# Patient Record
Sex: MALE | Race: BLACK OR AFRICAN AMERICAN | NOT HISPANIC OR LATINO | URBAN - METROPOLITAN AREA
[De-identification: names, ages, dates, MRNs, and addresses within clinical notes are randomized per-mention and may not be internally consistent; named-entity substitution may affect disease eponyms.]

---

## 2019-06-05 ENCOUNTER — OUTPATIENT (OUTPATIENT)
Dept: OUTPATIENT SERVICES | Facility: HOSPITAL | Age: 64
LOS: 1 days | Discharge: HOME | End: 2019-06-05

## 2019-06-11 LAB — SURGICAL PATHOLOGY STUDY: SIGNIFICANT CHANGE UP

## 2024-09-30 ENCOUNTER — OUTPATIENT (OUTPATIENT)
Dept: OUTPATIENT SERVICES | Facility: HOSPITAL | Age: 69
LOS: 1 days | End: 2024-09-30
Payer: MEDICARE

## 2024-09-30 DIAGNOSIS — R97.20 ELEVATED PROSTATE SPECIFIC ANTIGEN [PSA]: ICD-10-CM

## 2024-10-02 LAB — SURGICAL PATHOLOGY STUDY: SIGNIFICANT CHANGE UP

## 2024-10-02 PROCEDURE — 88321 CONSLTJ&REPRT SLD PREP ELSWR: CPT

## 2024-10-29 VITALS
SYSTOLIC BLOOD PRESSURE: 134 MMHG | HEART RATE: 75 BPM | HEIGHT: 75 IN | TEMPERATURE: 97 F | DIASTOLIC BLOOD PRESSURE: 82 MMHG | RESPIRATION RATE: 16 BRPM | OXYGEN SATURATION: 97 % | WEIGHT: 242.51 LBS

## 2024-10-29 RX ORDER — LORATADINE 10 MG/1
1 TABLET ORAL
Refills: 0 | DISCHARGE

## 2024-10-29 RX ORDER — ASPIRIN/MAG CARB/ALUMINUM AMIN 325 MG
0 TABLET ORAL
Refills: 0 | DISCHARGE

## 2024-10-29 RX ORDER — CALCIPOTRIENE 50 UG/G
1 CREAM TOPICAL
Refills: 0 | DISCHARGE

## 2024-10-29 NOTE — PATIENT PROFILE ADULT - HISTORY OF COVID-19 VACCINATION
The following changes were made to your medications today:   Continue all your current medications. The following lifestyle modifications are encouraged:  Continue regular exercise at least 30 minutes daily. Lowfat/Low Cholesterol diet advised. Low sodium diet <2000mgs/day. The following tests have been ordered:  None today       Follow up with primary cardiologist, Apurva Gonzalez in 6 months. .    Additional Educational Resources: For additional resources regarding your symptoms, diagnosis, or further health information, please visit the Health Resources section on [email or web address] or the Online Health Resources section in [employer] Yes

## 2024-10-29 NOTE — PRE-ANESTHESIA EVALUATION ADULT - NSANTHPMHFT_GEN_ALL_CORE
69-year-old male presenting for robotic prostatectomy and bilateral pelvic lymph node dissection. History of HLD HTN CKD ANIRUDH GERD Asthma.

## 2024-10-29 NOTE — PATIENT PROFILE ADULT - MONEY FOR FOOD
no Post-Care Instructions: MOHS POST-OPERATIVE WOUND CARE\\n\\n1. Leave bandage on for the first 2 days.  Do not get the bandage wet (i.e., submerged in a bath, pool, or sweating).  Please limit activities while your stitches are in.  Stitches can break.  Skin can tear.  If you feel tension on the stitches you must stop that motion/activity.\\n\\n2. A shower may be taken after the first 2 days. Please take sponge baths during the first two days after your surgery, unless instructed otherwise.\\n\\n3. Carefully remove the bandage so as not to irritate the wound. \\n\\n4. Gently cleanse the area with soap and water.\\n\\n5. Apply a thin layer of Vaseline or Bacitracin ointment to the wound.  If another antibiotic has been prescribed, please apply as instructed on the prescription packaging. \\n\\n8. Cut a piece of non-stick gauze or Telfa pad to a size just large enough to cover the surgical site.\\n\\n9. Apply medical tape or hypoallergenic paper tape (for those with allergy to bandage adhesive) to hold your bandage in place.\\n\\n10.  The surgical site should be cleansed and dressed as described above on a daily basis until the day of   \\n   suture removal.  \\n\\n11.  If shaving around the area, leave your bandage on and shave around it.  Shaving over the sutures could \\n allow the wound to open up or increase your infection risk.\\n\\nDO NOT SWIM OR GO IN A HOT TUB UNTIL SUTURES HAVE BEEN REMOVED.\\n \\nADDITIONAL INSTRUCTIONS:\\n\\n1. Bruising and swelling may occur after the surgery and can increase for up to 48 hours.  Do not be alarmed--this will resolve over time.  Applying ice compresses over the bandage will help to decrease swelling and bruising:   ICE: Wrap a clean hand-towel or handkerchief around your ice pack.\\n Apply over the bandage for 15 minutes on and off for 2 hours to help reduce swelling.\\n\\n2. If slight bleeding occurs, apply continuous pressure for 20 minutes.  If bleeding persists or is severe, call the office.  If the office is closed, call 911 or go to the nearest emergency room.\\n\\n3. If the wound becomes hot or extremely tender to touch or it is bright red around the wound, extending 1 inch beyond the wound edge, please contact us at [phone].  If after hours, call [phone].\\n\\n4. Some oozing from the wound is normal.\\n\\n5. If healing without sutures:  Signs of healing may not be apparent for 2-3 weeks and the wound may take 6-8 weeks or longer to completely heal.  Clean the area with soap and water daily. Apply Vaseline daily or prescription ointment as directed.

## 2024-10-30 ENCOUNTER — INPATIENT (INPATIENT)
Facility: HOSPITAL | Age: 69
LOS: 0 days | Discharge: HOME | DRG: 708 | End: 2024-10-31
Attending: UROLOGY | Admitting: UROLOGY
Payer: MEDICARE

## 2024-10-30 DIAGNOSIS — C61 MALIGNANT NEOPLASM OF PROSTATE: ICD-10-CM

## 2024-10-30 DIAGNOSIS — Z98.890 OTHER SPECIFIED POSTPROCEDURAL STATES: Chronic | ICD-10-CM

## 2024-10-30 DIAGNOSIS — Z41.9 ENCOUNTER FOR PROCEDURE FOR PURPOSES OTHER THAN REMEDYING HEALTH STATE, UNSPECIFIED: Chronic | ICD-10-CM

## 2024-10-30 LAB
BLD GP AB SCN SERPL QL: NEGATIVE — SIGNIFICANT CHANGE UP
RH IG SCN BLD-IMP: POSITIVE — SIGNIFICANT CHANGE UP

## 2024-10-30 PROCEDURE — 88309 TISSUE EXAM BY PATHOLOGIST: CPT | Mod: 26

## 2024-10-30 PROCEDURE — 88305 TISSUE EXAM BY PATHOLOGIST: CPT | Mod: 26

## 2024-10-30 DEVICE — LIGATING CLIPS WECK HEMOLOK POLYMER LARGE (PURPLE) 6: Type: IMPLANTABLE DEVICE | Site: BILATERAL | Status: FUNCTIONAL

## 2024-10-30 RX ORDER — HEPARIN SODIUM 10000 [USP'U]/ML
5000 INJECTION INTRAVENOUS; SUBCUTANEOUS EVERY 8 HOURS
Refills: 0 | Status: DISCONTINUED | OUTPATIENT
Start: 2024-10-30 | End: 2024-10-31

## 2024-10-30 RX ORDER — FAMOTIDINE 10 MG/ML
1 INJECTION INTRAVENOUS
Refills: 0 | DISCHARGE

## 2024-10-30 RX ORDER — ACETAMINOPHEN 500 MG
1000 TABLET ORAL ONCE
Refills: 0 | Status: COMPLETED | OUTPATIENT
Start: 2024-10-30 | End: 2024-10-31

## 2024-10-30 RX ORDER — LIDOCAINE HYDROCHLORIDE 40 MG/ML
1 SOLUTION TOPICAL EVERY 24 HOURS
Refills: 0 | Status: DISCONTINUED | OUTPATIENT
Start: 2024-10-30 | End: 2024-10-31

## 2024-10-30 RX ORDER — ACETAMINOPHEN 500 MG
1000 TABLET ORAL ONCE
Refills: 0 | Status: COMPLETED | OUTPATIENT
Start: 2024-10-30 | End: 2024-10-30

## 2024-10-30 RX ORDER — ALBUTEROL 90 MCG
2 AEROSOL (GRAM) INHALATION
Refills: 0 | DISCHARGE

## 2024-10-30 RX ORDER — OXYCODONE HYDROCHLORIDE 30 MG/1
5 TABLET ORAL EVERY 6 HOURS
Refills: 0 | Status: DISCONTINUED | OUTPATIENT
Start: 2024-10-30 | End: 2024-10-31

## 2024-10-30 RX ORDER — ONDANSETRON HYDROCHLORIDE 2 MG/ML
4 INJECTION, SOLUTION INTRAMUSCULAR; INTRAVENOUS EVERY 6 HOURS
Refills: 0 | Status: DISCONTINUED | OUTPATIENT
Start: 2024-10-30 | End: 2024-10-31

## 2024-10-30 RX ORDER — FLUTICASONE PROPIONATE 50 UG/1
2 SPRAY, METERED NASAL
Refills: 0 | DISCHARGE

## 2024-10-30 RX ORDER — HYDROMORPHONE HCL/0.9% NACL/PF 6 MG/30 ML
0.5 PATIENT CONTROLLED ANALGESIA SYRINGE INTRAVENOUS EVERY 4 HOURS
Refills: 0 | Status: DISCONTINUED | OUTPATIENT
Start: 2024-10-30 | End: 2024-10-31

## 2024-10-30 RX ORDER — AMLODIPINE BESYLATE 10 MG
10 TABLET ORAL DAILY
Refills: 0 | Status: DISCONTINUED | OUTPATIENT
Start: 2024-10-31 | End: 2024-10-31

## 2024-10-30 RX ORDER — SODIUM CHLORIDE 9 MG/ML
1000 INJECTION, SOLUTION INTRAMUSCULAR; INTRAVENOUS; SUBCUTANEOUS
Refills: 0 | Status: DISCONTINUED | OUTPATIENT
Start: 2024-10-30 | End: 2024-10-31

## 2024-10-30 RX ORDER — BRIMONIDINE TARTRATE 0.2 %
1 DROPS OPHTHALMIC (EYE)
Refills: 0 | DISCHARGE

## 2024-10-30 RX ORDER — ALBUTEROL 90 MCG
1 AEROSOL (GRAM) INHALATION EVERY 6 HOURS
Refills: 0 | Status: DISCONTINUED | OUTPATIENT
Start: 2024-10-30 | End: 2024-10-31

## 2024-10-30 RX ORDER — OXYBUTYNIN CHLORIDE 5 MG/1
5 TABLET ORAL EVERY 8 HOURS
Refills: 0 | Status: DISCONTINUED | OUTPATIENT
Start: 2024-10-30 | End: 2024-10-31

## 2024-10-30 RX ORDER — LOSARTAN POTASSIUM 25 MG/1
1 TABLET ORAL
Refills: 0 | DISCHARGE

## 2024-10-30 RX ORDER — AMLODIPINE AND ATORVASTATIN 5; 20 MG/1; MG/1
1 TABLET, COATED ORAL
Refills: 0 | DISCHARGE

## 2024-10-30 RX ORDER — LATANOPROST 0.005 %
1 DROPS OPHTHALMIC (EYE)
Refills: 0 | DISCHARGE

## 2024-10-30 RX ORDER — ACETAMINOPHEN 500 MG
975 TABLET ORAL EVERY 6 HOURS
Refills: 0 | Status: DISCONTINUED | OUTPATIENT
Start: 2024-10-30 | End: 2024-10-31

## 2024-10-30 RX ORDER — HEPARIN SODIUM 10000 [USP'U]/ML
5000 INJECTION INTRAVENOUS; SUBCUTANEOUS ONCE
Refills: 0 | Status: COMPLETED | OUTPATIENT
Start: 2024-10-30 | End: 2024-10-30

## 2024-10-30 RX ORDER — LOSARTAN POTASSIUM 25 MG/1
25 TABLET ORAL DAILY
Refills: 0 | Status: DISCONTINUED | OUTPATIENT
Start: 2024-10-31 | End: 2024-10-31

## 2024-10-30 RX ADMIN — Medication 1000 MILLIGRAM(S): at 14:13

## 2024-10-30 RX ADMIN — HEPARIN SODIUM 5000 UNIT(S): 10000 INJECTION INTRAVENOUS; SUBCUTANEOUS at 14:13

## 2024-10-30 NOTE — CONSULT NOTE ADULT - ASSESSMENT
HPI: 70 YO M with pmh asthma, htn, gerd, CKD, HLD, glaucoma, prostate ca presents for robotic radical prostatectomy with Dr Dean. Patient denies CP / SOB / DYspnea / palpitations / fevers / chills / N/ V  albuterol PRN for SOB / Wheezing Q 4 hours  continue norvasc with holding parameters / losartan if creatinine stable   famotidine daily for GERD, opthalmic solution for glaucoma    Home Medications:  acetaminophen 325 mg oral tablet: 3 tab(s) orally every 6 hours (31 Oct 2024 11:08)  Albuterol (Eqv-Proventil HFA) 90 mcg/inh inhalation aerosol: 2 inhaled every 6 hours (30 Oct 2024 14:07)  amLODIPine 10 mg oral tablet: 1 tab(s) orally once a day (31 Oct 2024 11:08)  amlodipine-atorvastatin 10 mg-20 mg oral tablet: 1 tab(s) orally once a day (30 Oct 2024 14:07)  brimonidine 0.1% ophthalmic solution: 1 drop(s) in each affected eye once a day (30 Oct 2024 14:07)  famotidine 40 mg oral tablet: 1 tab(s) orally once a day (30 Oct 2024 14:07)  fluticasone 50 mcg/inh nasal spray: 2 spray(s) in each nostril once a day (30 Oct 2024 14:07)  latanoprost 0.005% ophthalmic emulsion: 1 drop(s) in each affected eye once a day (30 Oct 2024 14:07)  losartan 25 mg oral tablet: 1 tab(s) orally once a day (30 Oct 2024 14:07)

## 2024-10-30 NOTE — CONSULT NOTE ADULT - SUBJECTIVE AND OBJECTIVE BOX
HPI: HPI:        PAST MEDICAL & SURGICAL HISTORY:  Asthma  as a child      Allergic rhinitis      HTN (hypertension)      GERD (gastroesophageal reflux disease)      CKD (chronic kidney disease)      HLD (hyperlipidemia)      Glaucoma      H/O colonoscopy      H/O endoscopy      Elective surgery  AVM stomach      H/O arthroscopy of knee  right      H/O wrist surgery  right      H/O sinus surgery          Allergies    penicillin (Hives; Rash)    Intolerances        MEDICATIONS  (STANDING):    MEDICATIONS  (PRN):      SOCIAL HISTORY:    FAMILY HISTORY:        Vital Signs Last 24 Hrs  T(C): --  T(F): --  HR: --  BP: --  BP(mean): --  RR: --  SpO2: --        I&O's Summary      LABS:                CAPILLARY BLOOD GLUCOSE          Cultures:      PHYSICAL EXAM:  General: NAD, resting comfortably  HEENT: NC/AT, EOMI, normal hearing, no oral lesions, no LAD, neck supple  Pulmonary: Normal resp effort, CTA-B  Cardiovascular: NSR, no murmurs  Abdominal: Soft, ND/NT, no organomegaly  Groin: Soft, nontender, no ecchymosis/hematoma, no erythema, no edema.  Extremities: (+) DP/PT pulses. FROM, normal strength, no clubbing/cyanosis/erythema/edema  Neuro: A/O x 3, CNs II-XII grossly intact, normal sensation, no focal deficits  Pulses: Palpable distal pulses    RADIOLOGY & ADDITIONAL STUDIES:      ASSESSMENT:      PLAN:         HPI: 68 YO M with pmh asthma, htn, gerd, CKD, HLD, glaucoma, prostate ca presents for robotic radical prostatectomy with Dr Dean. Patient denies CP / SOB / DYspnea / palpitations / fevers / chills / N/ V    ROS: All 12 systems reviewed and negative except for HPI     PAST MEDICAL & SURGICAL HISTORY:  Asthma  as a child      Allergic rhinitis      HTN (hypertension)      GERD (gastroesophageal reflux disease)      CKD (chronic kidney disease)      HLD (hyperlipidemia)      Glaucoma      H/O colonoscopy      H/O endoscopy      Elective surgery  AVM stomach      H/O arthroscopy of knee  right      H/O wrist surgery  right      H/O sinus surgery          Allergies    penicillin (Hives; Rash)    Intolerances        MEDICATIONS  (STANDING):    MEDICATIONS  (PRN):      SOCIAL HISTORY:    FAMILY HISTORY:        Vital Signs Last 24 Hrs  T(C): --  T(F): --  HR: --  BP: --  BP(mean): --  RR: --  SpO2: --        I&O's Summary      LABS:                CAPILLARY BLOOD GLUCOSE          Cultures:      PHYSICAL EXAM:  General: NAD, resting comfortably  HEENT: NC/AT, EOMI, normal hearing, no oral lesions, no LAD, neck supple  Pulmonary: Normal resp effort, CTA-B  Cardiovascular: NSR, no murmurs  Abdominal: Soft, ND/NT, no organomegaly  Extremities: (+) DP/PT pulses. FROM, normal strength, no clubbing/cyanosis/erythema/edema  Neuro: A/O x 3, CNs II-XII grossly intact, normal sensation, no focal deficits  Pulses: Palpable distal pulses    RADIOLOGY & ADDITIONAL STUDIES:      ASSESSMENT:      PLAN:

## 2024-10-31 VITALS
SYSTOLIC BLOOD PRESSURE: 129 MMHG | OXYGEN SATURATION: 95 % | HEART RATE: 92 BPM | RESPIRATION RATE: 17 BRPM | DIASTOLIC BLOOD PRESSURE: 77 MMHG | TEMPERATURE: 99 F

## 2024-10-31 LAB
ANION GAP SERPL CALC-SCNC: 12 MMOL/L — SIGNIFICANT CHANGE UP (ref 5–17)
BUN SERPL-MCNC: 19 MG/DL — SIGNIFICANT CHANGE UP (ref 7–23)
CALCIUM SERPL-MCNC: 8.6 MG/DL — SIGNIFICANT CHANGE UP (ref 8.4–10.5)
CHLORIDE SERPL-SCNC: 102 MMOL/L — SIGNIFICANT CHANGE UP (ref 96–108)
CO2 SERPL-SCNC: 23 MMOL/L — SIGNIFICANT CHANGE UP (ref 22–31)
CREAT SERPL-MCNC: 1.33 MG/DL — HIGH (ref 0.5–1.3)
EGFR: 58 ML/MIN/1.73M2 — LOW
GLUCOSE SERPL-MCNC: 155 MG/DL — HIGH (ref 70–99)
HCT VFR BLD CALC: 43 % — SIGNIFICANT CHANGE UP (ref 39–50)
HGB BLD-MCNC: 14.1 G/DL — SIGNIFICANT CHANGE UP (ref 13–17)
MCHC RBC-ENTMCNC: 27.2 PG — SIGNIFICANT CHANGE UP (ref 27–34)
MCHC RBC-ENTMCNC: 32.8 G/DL — SIGNIFICANT CHANGE UP (ref 32–36)
MCV RBC AUTO: 83 FL — SIGNIFICANT CHANGE UP (ref 80–100)
NRBC # BLD: 0 /100 WBCS — SIGNIFICANT CHANGE UP (ref 0–0)
PLATELET # BLD AUTO: 236 K/UL — SIGNIFICANT CHANGE UP (ref 150–400)
POTASSIUM SERPL-MCNC: 3.9 MMOL/L — SIGNIFICANT CHANGE UP (ref 3.5–5.3)
POTASSIUM SERPL-SCNC: 3.9 MMOL/L — SIGNIFICANT CHANGE UP (ref 3.5–5.3)
RBC # BLD: 5.18 M/UL — SIGNIFICANT CHANGE UP (ref 4.2–5.8)
RBC # FLD: 14.4 % — SIGNIFICANT CHANGE UP (ref 10.3–14.5)
SODIUM SERPL-SCNC: 137 MMOL/L — SIGNIFICANT CHANGE UP (ref 135–145)
WBC # BLD: 9.55 K/UL — SIGNIFICANT CHANGE UP (ref 3.8–10.5)
WBC # FLD AUTO: 9.55 K/UL — SIGNIFICANT CHANGE UP (ref 3.8–10.5)

## 2024-10-31 RX ORDER — LEVOFLOXACIN 750 MG/1
1 TABLET, FILM COATED ORAL
Qty: 3 | Refills: 0
Start: 2024-10-31 | End: 2024-11-02

## 2024-10-31 RX ORDER — CEFAZOLIN SODIUM 1 G
1000 VIAL (EA) INJECTION EVERY 8 HOURS
Refills: 0 | Status: COMPLETED | OUTPATIENT
Start: 2024-10-31 | End: 2024-10-31

## 2024-10-31 RX ORDER — ACETAMINOPHEN 500 MG
3 TABLET ORAL
Qty: 0 | Refills: 0 | DISCHARGE
Start: 2024-10-31

## 2024-10-31 RX ORDER — SIMETHICONE 80 MG/1
160 TABLET, CHEWABLE ORAL ONCE
Refills: 0 | Status: COMPLETED | OUTPATIENT
Start: 2024-10-31 | End: 2024-10-31

## 2024-10-31 RX ORDER — AMLODIPINE BESYLATE 10 MG
1 TABLET ORAL
Qty: 0 | Refills: 0 | DISCHARGE
Start: 2024-10-31

## 2024-10-31 RX ADMIN — Medication 975 MILLIGRAM(S): at 05:03

## 2024-10-31 RX ADMIN — Medication 10 MILLIGRAM(S): at 05:44

## 2024-10-31 RX ADMIN — Medication 0.5 MILLIGRAM(S): at 00:48

## 2024-10-31 RX ADMIN — Medication 100 MILLIGRAM(S): at 05:44

## 2024-10-31 RX ADMIN — LOSARTAN POTASSIUM 25 MILLIGRAM(S): 25 TABLET ORAL at 05:44

## 2024-10-31 RX ADMIN — LIDOCAINE HYDROCHLORIDE 1 PATCH: 40 SOLUTION TOPICAL at 11:40

## 2024-10-31 RX ADMIN — Medication 975 MILLIGRAM(S): at 11:34

## 2024-10-31 RX ADMIN — LIDOCAINE HYDROCHLORIDE 1 PATCH: 40 SOLUTION TOPICAL at 00:32

## 2024-10-31 RX ADMIN — LIDOCAINE HYDROCHLORIDE 1 PATCH: 40 SOLUTION TOPICAL at 07:00

## 2024-10-31 RX ADMIN — HEPARIN SODIUM 5000 UNIT(S): 10000 INJECTION INTRAVENOUS; SUBCUTANEOUS at 05:44

## 2024-10-31 RX ADMIN — Medication 1000 MILLIGRAM(S): at 00:48

## 2024-10-31 RX ADMIN — Medication 0.5 MILLIGRAM(S): at 00:31

## 2024-10-31 RX ADMIN — Medication 400 MILLIGRAM(S): at 00:09

## 2024-10-31 RX ADMIN — SIMETHICONE 160 MILLIGRAM(S): 80 TABLET, CHEWABLE ORAL at 05:44

## 2024-10-31 NOTE — DISCHARGE NOTE NURSING/CASE MANAGEMENT/SOCIAL WORK - PATIENT PORTAL LINK FT
You can access the FollowMyHealth Patient Portal offered by [facility] by registering at the following website: [email or web address]. By joining [facility]’s FollowMyHealth portal, you will also be able to view your health information using other applications (apps) compatible with our system.

## 2024-10-31 NOTE — PROGRESS NOTE ADULT - ASSESSMENT
HPI: 68 YO M with pmh asthma, htn, gerd, CKD, HLD, glaucoma, prostate ca presents for robotic radical prostatectomy with Dr Dean. Patient denies CP / SOB / DYspnea / palpitations / fevers / chills / N/ V  albuterol PRN for SOB / Wheezing Q 4 hours  continue norvasc with holding parameters / losartan if creatinine stable   famotidine daily for GERD, opthalmic solution for glaucoma  if issues with movement of left lower extremity persist -   patient possibly has radiculopathy from positioning in hospital bed, gait observed and normal, walking well, DTRs intact, strength intact b/l   recommend following with PCP for imaging within one week   if symptoms worsen, recommend to go to local ER     
69 year old male with histry of CaP s/p RALP 10/30

## 2024-10-31 NOTE — DISCHARGE NOTE NURSING/CASE MANAGEMENT/SOCIAL WORK - FINANCIAL ASSISTANCE
[facility] provides services at a reduced cost to those who are determined to be eligible through [facility]’s financial assistance program. Information regarding [facility]’s financial assistance program can be found by going to [email or web address] or by calling [phone].

## 2024-10-31 NOTE — PROGRESS NOTE ADULT - SUBJECTIVE AND OBJECTIVE BOX
HPI: 68 YO M with pmh asthma, htn, gerd, CKD, HLD, glaucoma, prostate ca presents for robotic radical prostatectomy with Dr Dean. Patient denies CP / SOB / DYspnea / palpitations / fevers / chills / N/ V    ROS: All 12 systems reviewed and negative except for HPI   pt seen and examined at bedside  pain is controlled post operatively  patient states does have some difficulty with coordination of his left leg movements  though patient walking well, states strength and sensation are wnl   denying previous history of TIA / CVA / radiculopathy/  lumbar spinal disease     Vital Signs Last 24 Hrs  T(C): 36.8 (31 Oct 2024 08:20), Max: 36.8 (31 Oct 2024 01:55)  T(F): 98.2 (31 Oct 2024 08:20), Max: 98.2 (31 Oct 2024 01:55)  HR: 101 (31 Oct 2024 08:20) (81 - 116)  BP: 132/88 (31 Oct 2024 08:20) (125/83 - 149/82)  BP(mean): 105 (31 Oct 2024 01:10) (100 - 108)  RR: 18 (31 Oct 2024 08:20) (10 - 22)  SpO2: 95% (31 Oct 2024 08:20) (93% - 98%)    Parameters below as of 31 Oct 2024 08:20  Patient On (Oxygen Delivery Method): room air    MEDICATIONS  (STANDING):  acetaminophen     Tablet .. 975 milliGRAM(s) Oral every 6 hours  amLODIPine   Tablet 10 milliGRAM(s) Oral daily  atorvastatin 20 milliGRAM(s) Oral at bedtime  ceFAZolin   IVPB 1000 milliGRAM(s) IV Intermittent every 8 hours  heparin   Injectable 5000 Unit(s) SubCutaneous every 8 hours  lidocaine   4% Patch 1 Patch Transdermal every 24 hours  losartan 25 milliGRAM(s) Oral daily  sodium chloride 0.9%. 1000 milliLiter(s) (120 mL/Hr) IV Continuous <Continuous>    MEDICATIONS  (PRN):  albuterol    90 MICROgram(s) HFA Inhaler 1 Puff(s) Inhalation every 6 hours PRN Shortness of Breath and/or Wheezing  HYDROmorphone  Injectable 0.5 milliGRAM(s) IV Push every 4 hours PRN breakthrough pain  ondansetron Injectable 4 milliGRAM(s) IV Push every 6 hours PRN Nausea and/or Vomiting  oxybutynin 5 milliGRAM(s) Oral every 8 hours PRN Bladder spasms  oxyCODONE    IR 5 milliGRAM(s) Oral every 6 hours PRN Severe Pain (7 - 10)        PHYSICAL EXAM:  General: NAD, resting comfortably  HEENT: NC/AT, EOMI, normal hearing, no oral lesions, no LAD, neck supple  Pulmonary: Normal resp effort, CTA-B  Cardiovascular: NSR, no murmurs  Abdominal: Soft, ND/NT, no organomegaly  Extremities: (+) DP/PT pulses. FROM, normal strength, no clubbing/cyanosis/erythema/edema  Neuro: A/O x 3, CNs II-XII grossly intact, normal sensation, strength b/l LE 5/5, 5/5 strength in LLE as well, reflexes 2+ patellas, ankle reflexes   gait observed and normal   Pulses: Palpable distal pulses    RADIOLOGY & ADDITIONAL STUDIES:      ASSESSMENT:      PLAN:        
INTERVAL HPI/OVERNIGHT EVENTS:  No acute events overnight.    VITALS:    T(F): 97.8 (10-31-24 @ 04:15), Max: 98.2 (10-31-24 @ 01:55)  HR: 116 (10-31-24 @ 04:15) (81 - 116)  BP: 131/87 (10-31-24 @ 04:15) (125/83 - 149/82)  RR: 18 (10-31-24 @ 04:15) (10 - 22)  SpO2: 95% (10-31-24 @ 04:15) (93% - 98%)  Wt(kg): --    I&O's Detail    30 Oct 2024 07:01  -  31 Oct 2024 05:22  --------------------------------------------------------  IN:    IV PiggyBack: 100 mL    sodium chloride 0.9%: 480 mL  Total IN: 580 mL    OUT:    Indwelling Catheter - Urethral (mL): 500 mL  Total OUT: 500 mL    Total NET: 80 mL          MEDICATIONS:    ANTIBIOTICS:  ceFAZolin   IVPB 1000 milliGRAM(s) IV Intermittent every 8 hours      PAIN CONTROL:  acetaminophen     Tablet .. 975 milliGRAM(s) Oral every 6 hours  HYDROmorphone  Injectable 0.5 milliGRAM(s) IV Push every 4 hours PRN  ondansetron Injectable 4 milliGRAM(s) IV Push every 6 hours PRN  oxyCODONE    IR 5 milliGRAM(s) Oral every 6 hours PRN       MEDS:  oxybutynin 5 milliGRAM(s) Oral every 8 hours PRN      HEME/ONC  heparin   Injectable 5000 Unit(s) SubCutaneous every 8 hours        PHYSICAL EXAM:  General: No acute distress.  Alert and Oriented  Abdominal Exam: incisions dry and clean, appropriately distended, appropriately tender to palpation   Exam: Potts catheter in place, urine clear

## 2024-10-31 NOTE — PROGRESS NOTE ADULT - PROBLEM SELECTOR PLAN 1
-stable  -BP stable  -pain control  -continue marinelli  -monitor I&Os  -OOB, IS  -DVT prophylaxis  -Abx prophylaxis  -am labs  -Diet: CLD.

## 2024-10-31 NOTE — DISCHARGE NOTE PROVIDER - HOSPITAL COURSE
69 year old male with history of CaP s/p RALP 10/30/24. Patient tolerated procedure well. He is afebrile, ambulatory, tolerating po diet, pain controlled and hemodynamically stable. Patient is planned for discharge home today, per urology.     69 year old male with history of CaP s/p RALP 10/30/24. Patient tolerated procedure well. He is afebrile, ambulatory, tolerating po diet, pain controlled and hemodynamically stable. Patient is medically clear for discharge home today.

## 2024-10-31 NOTE — DISCHARGE NOTE PROVIDER - NSDCMRMEDTOKEN_GEN_ALL_CORE_FT
Albuterol (Eqv-Proventil HFA) 90 mcg/inh inhalation aerosol: 2 inhaled every 6 hours  amlodipine-atorvastatin 10 mg-20 mg oral tablet: 1 tab(s) orally once a day  brimonidine 0.1% ophthalmic solution: 1 drop(s) in each affected eye once a day  famotidine 40 mg oral tablet: 1 tab(s) orally once a day  fluticasone 50 mcg/inh nasal spray: 2 spray(s) in each nostril once a day  latanoprost 0.005% ophthalmic emulsion: 1 drop(s) in each affected eye once a day  losartan 25 mg oral tablet: 1 tab(s) orally once a day   acetaminophen 325 mg oral tablet: 3 tab(s) orally every 6 hours  Albuterol (Eqv-Proventil HFA) 90 mcg/inh inhalation aerosol: 2 inhaled every 6 hours  amLODIPine 10 mg oral tablet: 1 tab(s) orally once a day  amlodipine-atorvastatin 10 mg-20 mg oral tablet: 1 tab(s) orally once a day  brimonidine 0.1% ophthalmic solution: 1 drop(s) in each affected eye once a day  famotidine 40 mg oral tablet: 1 tab(s) orally once a day  fluticasone 50 mcg/inh nasal spray: 2 spray(s) in each nostril once a day  latanoprost 0.005% ophthalmic emulsion: 1 drop(s) in each affected eye once a day  levoFLOXacin 500 mg oral tablet: 1 tab(s) orally once a day  losartan 25 mg oral tablet: 1 tab(s) orally once a day

## 2024-10-31 NOTE — DISCHARGE NOTE PROVIDER - NSDCFUADDINST_GEN_ALL_CORE_FT
MEDICATIONS  (STANDING):  amantadine 100 milliGRAM(s) Oral <User Schedule>  aspirin  chewable 81 milliGRAM(s) Oral daily  atorvastatin 20 milliGRAM(s) Oral at bedtime  diVALproex  milliGRAM(s) Oral <User Schedule>  FLUoxetine 10 milliGRAM(s) Oral <User Schedule>  heparin   Injectable 5000 Unit(s) SubCutaneous every 8 hours  melatonin 5 milliGRAM(s) Oral at bedtime  thyroid 30 milliGRAM(s) Oral daily    MEDICATIONS  (PRN):  ALBUTerol    90 MICROgram(s) HFA Inhaler 2 Puff(s) Inhalation every 6 hours PRN Shortness of Breath  
Stay well hydrated, continue to advance diet as tolerated, you may shower, no strenuous activity or heavy lifting.  Please call Dr Dean with fever>[phone], any questions and to schedule your follow up appointment. You are discharged with 3 days of antibiotics please begin taking today.

## 2024-11-05 LAB — SURGICAL PATHOLOGY STUDY: SIGNIFICANT CHANGE UP

## 2024-11-08 DIAGNOSIS — H40.9 UNSPECIFIED GLAUCOMA: ICD-10-CM

## 2024-11-08 DIAGNOSIS — Z87.891 PERSONAL HISTORY OF NICOTINE DEPENDENCE: ICD-10-CM

## 2024-11-08 DIAGNOSIS — C61 MALIGNANT NEOPLASM OF PROSTATE: ICD-10-CM

## 2024-11-08 DIAGNOSIS — E78.5 HYPERLIPIDEMIA, UNSPECIFIED: ICD-10-CM

## 2024-11-08 DIAGNOSIS — J45.909 UNSPECIFIED ASTHMA, UNCOMPLICATED: ICD-10-CM

## 2024-11-08 DIAGNOSIS — Z88.0 ALLERGY STATUS TO PENICILLIN: ICD-10-CM

## 2024-11-08 DIAGNOSIS — K21.9 GASTRO-ESOPHAGEAL REFLUX DISEASE WITHOUT ESOPHAGITIS: ICD-10-CM

## 2024-11-08 DIAGNOSIS — E11.22 TYPE 2 DIABETES MELLITUS WITH DIABETIC CHRONIC KIDNEY DISEASE: ICD-10-CM

## 2024-11-08 DIAGNOSIS — Z99.81 DEPENDENCE ON SUPPLEMENTAL OXYGEN: ICD-10-CM

## 2024-11-08 DIAGNOSIS — G47.33 OBSTRUCTIVE SLEEP APNEA (ADULT) (PEDIATRIC): ICD-10-CM

## 2024-11-08 DIAGNOSIS — I12.9 HYPERTENSIVE CHRONIC KIDNEY DISEASE WITH STAGE 1 THROUGH STAGE 4 CHRONIC KIDNEY DISEASE, OR UNSPECIFIED CHRONIC KIDNEY DISEASE: ICD-10-CM

## 2024-11-08 DIAGNOSIS — Z79.82 LONG TERM (CURRENT) USE OF ASPIRIN: ICD-10-CM

## 2024-11-08 DIAGNOSIS — Z79.51 LONG TERM (CURRENT) USE OF INHALED STEROIDS: ICD-10-CM

## 2024-11-20 PROCEDURE — 88321 CONSLTJ&REPRT SLD PREP ELSWR: CPT

## 2024-11-26 PROCEDURE — 86901 BLOOD TYPING SEROLOGIC RH(D): CPT

## 2024-11-26 PROCEDURE — 86850 RBC ANTIBODY SCREEN: CPT

## 2024-11-26 PROCEDURE — 36415 COLL VENOUS BLD VENIPUNCTURE: CPT

## 2024-11-26 PROCEDURE — 88309 TISSUE EXAM BY PATHOLOGIST: CPT

## 2024-11-26 PROCEDURE — 80048 BASIC METABOLIC PNL TOTAL CA: CPT

## 2024-11-26 PROCEDURE — 88305 TISSUE EXAM BY PATHOLOGIST: CPT

## 2024-11-26 PROCEDURE — 85027 COMPLETE CBC AUTOMATED: CPT

## 2024-11-26 PROCEDURE — 86900 BLOOD TYPING SEROLOGIC ABO: CPT

## 2024-11-26 PROCEDURE — S2900: CPT

## 2024-11-26 PROCEDURE — C1889: CPT

## 2024-11-26 PROCEDURE — C9399: CPT

## 2025-06-24 NOTE — PRE-ANESTHESIA EVALUATION ADULT - MALLAMPATI CLASS
Your meal plan may need to be adjusted to better meet your nutritional needs, such as higher protein and/or calories. For help with your nutrition plan based on your medical condition, personalizing your plan to your preferences, or answer other nutrition-related questions, outpatient nutrition counseling is available through [facility] Monday through Friday. Call [phone] to speak with a dietitian.    A physician's referral is not needed unless you have an HMO insurance plan. It is recommended that you check with your insurance provider for individual coverage..     
Class II - visualization of the soft palate, fauces, and uvula

## (undated) DEVICE — XI DRAPE ARM

## (undated) DEVICE — TROCAR COVIDIEN VERSAPORT BLADELESS OPTICAL 5MM STANDARD

## (undated) DEVICE — SUT VLOC 180 2-0 9" GS-22 GREEN

## (undated) DEVICE — FOLEY CATH 2-WAY 18FR 5CC SILICONE

## (undated) DEVICE — PACK GENERAL LAPAROSCOPY

## (undated) DEVICE — SYR CATHETER TIP 50ML

## (undated) DEVICE — SUT VICRYL PLUS 0 54" TIES

## (undated) DEVICE — FOLEY CATH 2-WAY 18FR 5CC LATEX HYDROGEL

## (undated) DEVICE — D HELP - CLEARVIEW CLEARIFY SYSTEM

## (undated) DEVICE — XI ARM FORCEP PROGRASP 8MM

## (undated) DEVICE — POSITIONER PINK PAD PIGAZZI SYSTEM FULL KIT

## (undated) DEVICE — TIP METZENBAUM SCISSOR MONOPOLAR ENDOCUT (ORANGE)

## (undated) DEVICE — SUT VLOC 90 3-0 6" CV-23 UNDYED

## (undated) DEVICE — XI OBTURATOR OPTICAL BLADELESS 8MM

## (undated) DEVICE — SUT VICRYL 0 27" UR-6

## (undated) DEVICE — Device

## (undated) DEVICE — DRAIN JACKSON PRATT 10MM FLAT 3/4 NO TROCAR

## (undated) DEVICE — XI SEAL UNIVERSIAL 5-12MM

## (undated) DEVICE — ENDOCATCH 10MM SPECIMEN POUCH

## (undated) DEVICE — TUBING AIRSEAL TRI-LUMEN FILTERED

## (undated) DEVICE — DRAIN RESERVOIR FOR JACKSON PRATT 100CC CARDINAL

## (undated) DEVICE — XI ARM NEEDLE DRIVER LARGE

## (undated) DEVICE — GLV 7.5 PROTEXIS (WHITE)

## (undated) DEVICE — XI TIP COVER

## (undated) DEVICE — SUT MONOCRYL 4-0 27" PS-2 UNDYED

## (undated) DEVICE — SUT VICRYL 2-0 27" RB-1

## (undated) DEVICE — FOLEY HOLDER STATLOCK 2 WAY ADULT

## (undated) DEVICE — SUT VLOC 180 3-0 6" V-20 GREEN

## (undated) DEVICE — INSUFFLATION NDL COVIDIEN SURGINEEDLE VERESS 120MM

## (undated) DEVICE — TROCAR SURGIQUEST AIRSEAL 12MMX100MM

## (undated) DEVICE — DRAINAGE BAG URINARY 2L

## (undated) DEVICE — DRSG DERMABOND 0.7ML

## (undated) DEVICE — DRAPE INSTRUMENT POUCH 10" X 18"

## (undated) DEVICE — XI ARM CLIP APPLIER LARGE

## (undated) DEVICE — XI ARM SCISSOR MONO CURVED

## (undated) DEVICE — PREP BETADINE KIT

## (undated) DEVICE — XI DRAPE COLUMN

## (undated) DEVICE — SUT VLOC 180 0 9" GS-21 GREEN